# Patient Record
Sex: MALE | URBAN - METROPOLITAN AREA
[De-identification: names, ages, dates, MRNs, and addresses within clinical notes are randomized per-mention and may not be internally consistent; named-entity substitution may affect disease eponyms.]

---

## 2020-04-06 ENCOUNTER — APPOINTMENT (RX ONLY)
Dept: URBAN - METROPOLITAN AREA CLINIC 157 | Facility: CLINIC | Age: 40
Setting detail: DERMATOLOGY
End: 2020-04-06

## 2020-04-06 DIAGNOSIS — L29.89 OTHER PRURITUS: ICD-10-CM

## 2020-04-06 DIAGNOSIS — L73.0 ACNE KELOID: ICD-10-CM

## 2020-04-06 PROBLEM — L29.8 OTHER PRURITUS: Status: ACTIVE | Noted: 2020-04-06

## 2020-04-06 PROCEDURE — 99203 OFFICE O/P NEW LOW 30 MIN: CPT

## 2020-04-06 PROCEDURE — ? PRESCRIPTION

## 2020-04-06 PROCEDURE — ? TREATMENT REGIMEN

## 2020-04-06 PROCEDURE — ? COUNSELING

## 2020-04-06 RX ORDER — TRETIONIN 0.5 MG/G
APPLY CREAM TOPICAL NIGHTLY
Qty: 20 | Refills: 11 | Status: ERX | COMMUNITY
Start: 2020-04-06

## 2020-04-06 RX ORDER — CLINDAMYCIN PHOSPHATE 10 MG/ML
APPLY LOTION TOPICAL DAILY
Qty: 60 | Refills: 5 | Status: ERX | COMMUNITY
Start: 2020-04-06

## 2020-04-06 RX ADMIN — TRETIONIN APPLY: 0.5 CREAM TOPICAL at 00:00

## 2020-04-06 RX ADMIN — CLINDAMYCIN PHOSPHATE APPLY: 10 LOTION TOPICAL at 00:00

## 2020-04-06 ASSESSMENT — LOCATION SIMPLE DESCRIPTION DERM
LOCATION SIMPLE: RIGHT FOREARM
LOCATION SIMPLE: LEFT UPPER BACK
LOCATION SIMPLE: POSTERIOR NECK
LOCATION SIMPLE: LEFT FOREARM

## 2020-04-06 ASSESSMENT — LOCATION ZONE DERM
LOCATION ZONE: TRUNK
LOCATION ZONE: ARM
LOCATION ZONE: NECK

## 2020-04-06 ASSESSMENT — LOCATION DETAILED DESCRIPTION DERM
LOCATION DETAILED: RIGHT PROXIMAL DORSAL FOREARM
LOCATION DETAILED: LEFT PROXIMAL DORSAL FOREARM
LOCATION DETAILED: MID POSTERIOR NECK
LOCATION DETAILED: LEFT INFERIOR MEDIAL UPPER BACK

## 2020-04-06 ASSESSMENT — SEVERITY ASSESSMENT: SEVERITY: MILD TO MODERATE

## 2020-04-06 NOTE — PROCEDURE: TREATMENT REGIMEN
Plan: Mild and occurs when outdoors/hot; recommend for now taking a cetirizine daily, and using Sarna or Dermend anti-itch with pramoxine daily after bathing.
Plan: Start Tretinoin 0.05% cream nightly to affected areas, BPO wash daily, and Clindamycin lotion BID when flaring. Follow up in 3 months or sooner if needed
Detail Level: Simple
Initiate Treatment: Cetirizine 10mg daily
Otc Regimen: Benzoyl peroxide wash
Initiate Treatment: Tretinoin 0.05% cream night, Clindamycin 1% lotion daily